# Patient Record
Sex: FEMALE | Race: WHITE
[De-identification: names, ages, dates, MRNs, and addresses within clinical notes are randomized per-mention and may not be internally consistent; named-entity substitution may affect disease eponyms.]

---

## 2017-10-12 ENCOUNTER — HOSPITAL ENCOUNTER (OUTPATIENT)
Dept: HOSPITAL 80 - FIMAGING | Age: 32
End: 2017-10-12
Attending: OBSTETRICS & GYNECOLOGY
Payer: COMMERCIAL

## 2017-10-12 DIAGNOSIS — Z34.82: Primary | ICD-10-CM

## 2017-10-12 DIAGNOSIS — Z3A.19: ICD-10-CM

## 2018-09-08 ENCOUNTER — HOSPITAL ENCOUNTER (OUTPATIENT)
Dept: HOSPITAL 80 - FIMAGING | Age: 33
End: 2018-09-08
Attending: PSYCHIATRY & NEUROLOGY
Payer: COMMERCIAL

## 2018-09-08 DIAGNOSIS — R56.9: Primary | ICD-10-CM

## 2018-09-10 ENCOUNTER — HOSPITAL ENCOUNTER (OUTPATIENT)
Dept: HOSPITAL 80 - FCPNEURO | Age: 33
End: 2018-09-10
Attending: PSYCHIATRY & NEUROLOGY
Payer: COMMERCIAL

## 2018-09-10 DIAGNOSIS — R56.9: ICD-10-CM

## 2018-09-10 DIAGNOSIS — R94.01: Primary | ICD-10-CM

## 2018-09-10 NOTE — CPEEG
4-HOUR VIDEO EEG



DATE OF STUDY:  09/10/2018





INTERPRETATION:  This 4-hour video EEG recording is abnormal due to very 
frequent potentially epileptogenic abnormalities over the frontal midline and 
right mesial frontal head regions.  These findings would be consistent with a 
focal seizure disorder.  



In addition, there was a mild degree of focal slowing over the  frontal midline 
and right mesial frontal head regions.  These findings would be consistent with 
a mild focal disturbance of cerebral function in these regions.  The patient 
did not have any clinical events during the video EEG monitoring session.



REPORT:  This 4-hour video EEG monitoring session contains 10 Hz alpha activity 
to the posterior head regions.  The primary feature of this recording was the 
presence of very frequent spike and sharp wave discharges over the frontal 
midline and lateral right frontal head regions (electrodes Fz and F4), maximal 
frontal midline.  These occurred at baseline and continued during wakefulness 
and during hyperventilation and photic stimulation.  During drowsiness and sleep
, there was continued activation of very frequent right mesial frontal spikes 
and sharp waves with increased propagation over the right lateral frontal head 
regions.  These discharges occurred in prolonged trains at typically 4 hertz 
lasting up to 10 seconds without definite evolution into electrographic 
seizures.  The patient did not have any clinical events during the video EEG 
monitoring session.





Job #:  [id number]

MTDD

## 2019-03-27 ENCOUNTER — HOSPITAL ENCOUNTER (OUTPATIENT)
Dept: HOSPITAL 80 - FSGY | Age: 34
Discharge: HOME | End: 2019-03-27
Attending: OTOLARYNGOLOGY
Payer: COMMERCIAL

## 2019-03-27 VITALS — DIASTOLIC BLOOD PRESSURE: 75 MMHG | SYSTOLIC BLOOD PRESSURE: 125 MMHG

## 2019-03-27 DIAGNOSIS — G40.909: ICD-10-CM

## 2019-03-27 DIAGNOSIS — J34.89: ICD-10-CM

## 2019-03-27 DIAGNOSIS — J34.2: ICD-10-CM

## 2019-03-27 DIAGNOSIS — Z88.2: ICD-10-CM

## 2019-03-27 DIAGNOSIS — G47.33: ICD-10-CM

## 2019-03-27 DIAGNOSIS — Z87.891: ICD-10-CM

## 2019-03-27 DIAGNOSIS — J34.3: Primary | ICD-10-CM

## 2019-03-27 PROCEDURE — 09SM4ZZ REPOSITION NASAL SEPTUM, PERCUTANEOUS ENDOSCOPIC APPROACH: ICD-10-PCS | Performed by: OTOLARYNGOLOGY

## 2019-03-27 PROCEDURE — 095L8ZZ DESTRUCTION OF NASAL TURBINATE, VIA NATURAL OR ARTIFICIAL OPENING ENDOSCOPIC: ICD-10-PCS | Performed by: OTOLARYNGOLOGY

## 2019-03-27 PROCEDURE — C9749 REPAIR NASAL STENOSIS W/IMP: HCPCS

## 2019-03-27 PROCEDURE — 09QL8ZZ REPAIR NASAL TURBINATE, VIA NATURAL OR ARTIFICIAL OPENING ENDOSCOPIC: ICD-10-PCS | Performed by: OTOLARYNGOLOGY

## 2019-03-27 NOTE — PDHPUP
History & Physical Update


H&P update statement: 


This history and physical update is based on an assessment of the patient which 

was completed after admission or registration (within 24 hours), but prior to 

the surgery/procedure.





H&P update: H&P reviewed & patient examined, no change in patient's condition 

since H&P completed (preop done last week, no changes)

## 2019-03-27 NOTE — PDANEPAE
ANE History of Present Illness





here for septoplasty 





ANE Past Medical History





- Cardiovascular History


Hx Hypertension: No


Hx Arrhythmias: No


Hx Chest Pain: No


Hx Coronary Artery / Peripheral Vascular Disease: No


Hx CHF / Valvular Disease: No


Hx Palpitations: No





- Pulmonary History


Hx COPD: No


Hx Asthma/Reactive Airway Disease: No


Hx Recent Upper Respiratory Infection: No


Hx Oxygen in Use at Home: No


Hx Sleep Apnea: Yes


Sleep Apnea Screening Result - Last Documented: Negative


Pulmonary History Comment: daly positive- uses nasal cpap- instructed pt not to 

bring d/t expected nasal packing





- Neurologic History


Hx Cerebrovascular Accident: No


Hx Seizures: Yes


Hx Dementia: No


Neurologic History Comment: epilepsy- last seizure 09/4/2018





- Endocrine History


Hx Diabetes: No





- Renal History


Hx Renal Disorders: No





- Liver History


Hx Hepatic Disorders: No





- Neurological & Psychiatric Hx


Hx Neurological and Psychiatric Disorders: No





- Cancer History


Hx Cancer: No





- Congenital Disorder History


Hx Congenital Disorders: No





- GI History


Hx Gastrointestinal Disorders: No





- Other Health History


Other Health History: eczema





- Chronic Pain History


Chronic Pain: No





- Surgical History


Prior Surgeries: PE tubes as child





ANE Review of Systems


Review of systems is: negative


Review of Systems: 








- Exercise capacity


Exercise capacity: >=4 METS


METS (RN): 4 METS





ANE Patient History





- Allergies


Allergies/Adverse Reactions: 








ibuprofen Allergy (Verified 03/08/19 15:47)


 Hives


Sulfa (Sulfonamide Antibiotics) Allergy (Verified 03/08/19 15:47)


 Hives








- Home Medications


Home medications: home medication list seen and reviewed


Home Medications: 








Herbals/Supplements -Info Only  03/08/19 [Last Taken 1 Week Ago ~03/20/19]


Lamotrigine  03/08/19 [Last Taken 03/27/19 06:00]








- NPO status


NPO Status: no food or drink >8 hours


NPO Since - Liquids (Date): 03/26/19


NPO Since - Liquids (Time): 23:00


NPO Since - Solids (Date): 03/26/19


NPO Since - Solids (Time): 22:00





- Anes Hx


Anes Hx: no prior problems





- Smoking Hx


Smoking Status: Former smoker





- Family Anes Hx


Family Hx Anesthesia Complications: none





ANE Labs/Vital Signs





- Vital Signs


Vital Signs: reviewed preoperatively; see RN documention for details


Blood Pressure: 117/76


Heart Rate: 71


Respiratory Rate: 16


O2 Sat (%): 99


Height: 172.72 cm


Weight: 90.718 kg





ANE Physical Exam





- Airway


Neck exam: FROM


Mallampati Score: Class 1


Mouth exam: normal dental/mouth exam





- Pulmonary


Pulmonary: no respiratory distress





- Cardiovascular


Cardiovascular: regular rate and rhythym





- ASA Status


ASA Status: II





ANE Anesthesia Plan


Anesthesia Plan: general endotracheal anesthesia

## 2019-04-11 NOTE — GOP
[f 
rep st]



                                                                OPERATIVE REPORT





DATE OF OPERATION:  03/27/2019



SURGEON:  Silva Dallas MD



ANESTHESIA:  General.



PREOPERATIVE DIAGNOSIS:  

1.  Nasal obstruction.

2.  Deviated nasal septum.

3.  Inferior turbinate enlargement bilaterally.

4.  Obstructive sleep apnea.



POSTOPERATIVE DIAGNOSIS:  

1.  Nasal obstruction.

2.  Deviated nasal septum.

3.  Inferior turbinate enlargement bilaterally.

4.  Obstructive sleep apnea.



PROCEDURE PERFORMED:  

1.  Endoscopically assisted septoplasty.

2.  Submucous resection of the inferior turbinates bilaterally.

3.  Bilateral Latera implants.



FINDINGS:  Significantly deviated nasal septum to the left.  She had enlarged 
inferior turbinates bilaterally, but more so on the right, and she had internal 
valve collapse on both sides, more so on the left.





ESTIMATED BLOOD LOSS:  Minimal.



INDICATIONS:  The patient is a very pleasant 33-year-old woman who has a 
history of mild sleep apnea.  She also has epilepsy, which they think the sleep 
apnea may be triggering.  She was found to have the above-noted findings and it 
was felt she would benefit from the above procedure.



DESCRIPTION OF PROCEDURE:  The patient was first seen in the preoperative area 
where informed consent was obtained.  She was then brought back to the 
operating room where Anesthesia sedated and intubated her.  Universal time-out 
protocol was performed.  Once this had been confirmed, the bed was turned 90 
degrees.  She was prepped and draped in a sterile fashion.  I initially placed 
some epinephrine-soaked pledgets within the nares bilaterally, and once this 
had sufficient time to act, these were removed.  I then injected about 7 cc of 1
% lidocaine with 1:100,000 epinephrine into the septum on either side, as well 
as the inferior turbinate heads on both sides.  Once this was done and this had 
sufficient time to act, I then performed a left-sided hemitransfixion incision 
and then a caudal elevator was used to elevate the mucoperichondrial flap off 
the underlying cartilage back to the bony cartilaginous junction.  I also used 
a 0-degree scope through the incision to help with elevation more posteriorly 
and superiorly.  She had a fairly large spur on the left side and care was 
taken to gently release this from the underlying cartilage and bone.  Once this 
was done, the bony cartilaginous junction was dislocated and then a D knife was 
used to make an incision from posterior to anterior and superior to inferior, 
thereby creating a square of cartilage.  A dorsal and caudal strut was left at 
about 10 mm each to make sure that there was strength to the nasal structure.  
The mucoperichondrial flap on the right side was released and then the 
cartilage wedge was removed from the hemitransfixion incision.  At this point, 
with the help of the 0-degree scope, a combination of hand instruments were 
used to take down the bony portions of the septum that were obstructing.  Once 
this was done, the 0-degree scope was used to evaluate the nasal cavity on both 
sides and she was noted to have significantly more patency on both sides, 
especially on the left.  A 5-0 plain gut on a Tyler needle was placed through 
and through the septum to quilt the septum together, and then a 4-0 chromic was 
used to close the hemitransfixion incision in interrupted fashion.  

At this point, prior to performing the turbinate reduction, some Betadine was 
used to sterilize the anterior nares, and then the Latera implant marker was 
used to make our marks just at the alar crease, in between the nasal bridge and 
the medial canthus, and then lastly at the nasal sill.  I did this on both sides
, marking out where the implant would be placed and then about 0.5 cc of the 1% 
lidocaine with 1:100,000 epinephrine was injected along this direction on both 
sides.  Once this had sufficient time to act, a small amount of pressure was 
placed over the nasal dorsum to prevent any significant swelling.  

After this had been done, the 2 mm turbinate blade was placed on the 
microdebrider and then using a 0-degree scope for direct visualization, the 
turbinate blade was used to perform a submucous resection along the length of 
the turbinate, 1st on the left and then on the right.  The blade was removed 
and then a [place] was used to 1st infracture and then outfracture the inferior 
but still had the internal valve collapse.  A Cleary splint that had been cut to 
size was then placed along either side of the septum and then sutured into 
place using 3-0 Prolene and then lastly, we turned our attention to the Latera.
  

At this point, the implant was placed into the cannula introducer and then a 
double-prong skin hook was placed at the edge of the nasal sill on the left and 
then everted slightly.  The cannula was placed in a 90 degree angle directly 
down to the alar crease region and then the double-prong skin hook was pulled 
caudally, thereby creating some tension and then the cannula was advanced under 
the skin and then over the cartilage and bone to the point that was previously 
marked out between the nasal dorsum and the medial canthus.  At this point, the 
implant was deployed and the cannula was released and then removed, noting no 
implant material from the incision.  I then did the exact same thing on the 
right side, placing the implant at the predetermined positions.  Once this was 
done, the nasal cavity was irrigated out copiously with normal saline and then 
suctioned clear.  An OG suction was used to suction out the oropharynx and 
stomach and then removed.  Epinephrine-soaked pledget puts were placed along 
the septum on both sides, giving some hemostasis to the inferior turbinates.  
These were tied together, and then the patient was turned back over to 
Anesthesia where she was awakened, extubated, and taken to PACU in stable 
condition.  There were no complications.  She tolerated the procedure well and 
the pledgets were removed in the PACU.



COMPLICATIONS:  None.





Job #:  [id number]

MTDD